# Patient Record
Sex: MALE | Race: OTHER | URBAN - METROPOLITAN AREA
[De-identification: names, ages, dates, MRNs, and addresses within clinical notes are randomized per-mention and may not be internally consistent; named-entity substitution may affect disease eponyms.]

---

## 2017-06-17 ENCOUNTER — OFFICE VISIT (OUTPATIENT)
Dept: FAMILY MEDICINE CLINIC | Age: 42
End: 2017-06-17

## 2017-06-17 VITALS
BODY MASS INDEX: 34.16 KG/M2 | HEIGHT: 65 IN | WEIGHT: 205 LBS | SYSTOLIC BLOOD PRESSURE: 146 MMHG | TEMPERATURE: 97.6 F | HEART RATE: 87 BPM | DIASTOLIC BLOOD PRESSURE: 100 MMHG

## 2017-06-17 DIAGNOSIS — S39.012A LUMBAR STRAIN, INITIAL ENCOUNTER: Primary | ICD-10-CM

## 2017-06-17 DIAGNOSIS — R03.0 ELEVATED BLOOD PRESSURE READING: ICD-10-CM

## 2017-06-17 DIAGNOSIS — E66.9 OBESITY, UNSPECIFIED OBESITY SEVERITY, UNSPECIFIED OBESITY TYPE: ICD-10-CM

## 2017-06-17 RX ORDER — METHOCARBAMOL 500 MG/1
500 TABLET, FILM COATED ORAL 3 TIMES DAILY
Qty: 15 TAB | Refills: 0 | Status: SHIPPED | OUTPATIENT
Start: 2017-06-17 | End: 2022-09-08

## 2017-06-17 RX ORDER — NAPROXEN 500 MG/1
500 TABLET ORAL 2 TIMES DAILY WITH MEALS
Qty: 60 TAB | Refills: 0 | Status: SHIPPED | OUTPATIENT
Start: 2017-06-17 | End: 2022-09-08

## 2017-06-17 NOTE — PROGRESS NOTES
Assessment/Plan:    Jonathan Lal was seen today for back pain and sleep problem. Diagnoses and all orders for this visit:    Lumbar strain, initial encounter  -     methocarbamol (ROBAXIN) 500 mg tablet; Take 1 Tab by mouth three (3) times daily. [place] 1 pastilla cada 8 horas, puedo causar sueno  -     naproxen (NAPROSYN) 500 mg tablet; Take 1 Tab by mouth two (2) times daily (with meals). [place] 1 pastilla 2 veces al kodak con comida    Obesity, unspecified obesity severity, unspecified obesity type    Elevated blood pressure reading    No hx of HTN. Lifestyle changes discussed in depth. F/up in 1 month for recheck    Follow-up Disposition:  Return in about 1 month (around 7/17/2017). SAÚL Hendrix expressed understanding of this plan. An AVS was printed and given to the patient.      ----------------------------------------------------------------------    Chief Complaint   Patient presents with    Back Pain     Back pain X about 5 months    Sleep Problem     Sleep Problem       History of Present Illness:    5 months of right sided low back pain. Saw a chiropractor 3 times but was paying out of pocket and was not improving so he stopped going there. For the past month, he has not been able to work due to the pain. 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 He does not have any bowel or bladder changes and he does not have radiation of the pain. The pt has not had any hx of HTN. He used to weight 175 lbs but over the past few years has gained up to 225 lbs. He has started to lose weight recently on purpose by changing his diet - eating more vegetables. He does not drink ETOH for 2 years. He does not exercise but is generally active through work and with his 3 kids. He is a non smoker    No past medical history on file.     Current Outpatient Prescriptions   Medication Sig Dispense Refill    methocarbamol (ROBAXIN) 500 mg tablet Take 1 Tab by mouth three (3) times daily. [place] 1 pastilla cada 8 horas, puedo causar sueno 15 Tab 0    naproxen (NAPROSYN) 500 mg tablet Take 1 Tab by mouth two (2) times daily (with meals). [place] 1 pastilla 2 veces al kodak con comida 60 Tab 0       No Known Allergies    Social History   Substance Use Topics    Smoking status: Current Every Day Smoker    Smokeless tobacco: Not on file      Comment: OCC    Alcohol use No       No family history on file. Physical Exam:     Visit Vitals    BP (!) 146/100 (BP 1 Location: Left arm, BP Patient Position: Sitting)    Pulse 87    Temp 97.6 °F (36.4 °C) (Oral)    Ht 5' 5.16\" (1.655 m)    Wt 205 lb (93 kg)    BMI 33.95 kg/m2     Looks well, pleasant, large protruding abdominal area  A&Ox3  WDWN NAD  Respirations normal and non labored  MSK exam- tender right paraspinal lumbar muscles. Toe touch reproduces pain at right paraspinal region. SLR right also reproduces pain.   Neuro intact down to toes  Watched pt reproduce his normal lifting technique which was by bending at the waist, then we practiced the correct lifting technique by bending knees with straight back

## 2017-06-17 NOTE — PROGRESS NOTES
AVS printed and reviewed. E scripts discussed and Good Rx wallet card given. F/u 1 month recommended and/or sooner and if sx worsen. Discussion assisted by CAV , Al Jacome.

## 2017-06-17 NOTE — PATIENT INSTRUCTIONS
Distensión de la espalda: Instrucciones de cuidado - [ Back Strain: Care Instructions ]  Instrucciones de cuidado    La distensión de la espalda ocurre cuando usted estira demasiado, o fuerza, un músculo de la espalda. Usted puede lesionarse la espalda en un accidente o cuando hace ejercicio o levanta algo. La mayoría de los daiana de espalda mejoran con reposo y [place]. Usted puede cuidarse en el hogar para ayudar a que myers espalda sane. La atención de seguimiento es swteha parte clave de myers tratamiento y seguridad. Asegúrese de hacer y acudir a todas las citas, y llame a myers médico si está teniendo problemas. También es swetha buena idea saber los resultados de los exámenes y mantener swetha lista de los medicamentos que jordan. ¿Cómo puede cuidarse en el hogar? · Trate de permanecer tan activo marie pueda, aubree deje de hacer o reduzca cualquier actividad que le produzca dolor. · Colóquese hielo o swetha compresa fría en el músculo adolorido de 10 a 20 minutos cada vez para detener la hinchazón. Trate de hacerlo cada 1 o 2 horas amparo 3 días (cuando esté despierto) o hasta que la hinchazón baje. Póngase un paño romero entre el hielo y la piel. · Después de 2 o 3 días, coloque swetha almohadilla térmica ajustada a baja temperatura o un paño tibio sobre la espalda. Algunos médicos sugieren que se alterne entre tratamientos calientes y fríos. · [employer] analgésicos (medicamentos para el dolor) exactamente según las indicaciones. ¨ Si el médico le recetó un analgésico, tómelo según las indicaciones. ¨ Si no está tomando un analgésico recetado, pregúntele a myers médico si puede anabel jesús de [place]. · Trate de dormir de lado con swetha almohada entre las piernas. O, colóquese swetha almohada debajo de las rodillas cuando se acueste [place] Estas medidas pueden aliviar el dolor en la parte baja de la espalda. · Eddie Mohan a poco a myers nivel habitual de actividades. ¿Cuándo debe pedir ayuda?   Llame al 911 en cualquier momento que considere que necesita atención de [place]. Por ejemplo, llame si:  · Es completamente incapaz de  swetha pierna. Llame a myers médico ahora mismo o busque atención médica inmediata si:  · Tiene síntomas nuevos o peores en las piernas, el abdomen o las nalgas (glúteos). Los síntomas pueden incluir:  ¨ Entumecimiento u hormigueo. ¨ Debilidad. ¨ Dolor. · Pierde el control de la vejiga o del intestino. Preste especial atención a los cambios en myers rosalio y asegúrese de comunicarse con myers médico si no mejora marie se esperaba. ¿Dónde puede encontrar más información en inglés? Tony Nagy a http://laila-neftaly.info/. Trini LOPEZ en la búsqueda para aprender más acerca de \"Distensión de la espalda: Instrucciones de cuidado - [ Back Strain: Care Instructions ]. \"  Revisado: [address], 2016  Versión del contenido: 11.2  © 2719-5740 Healthwise, Incorporated. Las instrucciones de cuidado fueron adaptadas bajo licencia por [employer] (which disclaims liability or warranty for this information). Si usted tiene [place] afección médica o sobre estas instrucciones, siempre pregunte a myers profesional de rosalio. Healthwise, Incorporated niega toda garantía o responsabilidad por myers uso de esta información. Presión arterial elevada: Instrucciones de cuidado - [ Elevated Blood Pressure: Care Instructions ]  Instrucciones de cuidado    La presión arterial es swetha medida de la fuerza que ejerce la damon contra las rivas de las arterias. Es normal que la presión arterial suba y baje a lo javon del día. Kaveh si se mantiene blayne por un tiempo, usted tiene presión arterial blayne. Dos números indican myers presión arterial. El primer número es la presión sistólica. Muestra qué tan karly presiona la damon cuando el corazón está bombeando. El genna número es la presión diastólica.  Muestra qué tan karly presiona la [employer] latidos, cuando el corazón está relajado y llenándose de damon. [facility] arterial ideal para adultos es menos de 120/80 (diga \"120 sobre 80\"). La presión arterial blayne es de 140/90 o superior. Usted tiene la presión arterial blayne si el número de [place] es 140 o superior o el número de abajo es 90 o superior, o ambas cosas. La prueba principal para la presión arterial blayne es simple, rápida e indolora. Para diagnosticar presión arterial blayne, myers médico examinará myers presión arterial en momentos diferentes. Después de tomarle la presión, es posible que myers médico le pida que se vuelva a anabel la presión en casa. Si se le diagnostica presión arterial blayne, puede colaborar con myers médico para elaborar un plan a javon plazo para manejarla. La atención de seguimiento es swetha parte clave de myers tratamiento y seguridad. Asegúrese de hacer y acudir a todas las citas, y llame a myers médico si está teniendo problemas. También es swetha buena idea saber los resultados de kika exámenes y mantener swteha lista de los medicamentos que jordan. ¿Cómo puede cuidarse en el hogar? · No fume. Fumar aumenta myers riesgo de ataque cerebral y ataque al corazón. Si necesita ayuda para dejarlo, hable con myers médico sobre programas y medicamentos para dejar de fumar. Estos pueden aumentar kika probabilidades de dejar de fumar para siempre. · Mantenga un peso saludable. · Trate de limitar la cantidad de sodio que ingiere a menos de 2,300 miligramos (mg) al día. Myers médico podría pedirle que trate de ingerir menos de 1,500 mg al día. · Manténgase físicamente activo. Pari al menos 30 minutos de ejercicio la mayoría de los días de la [place]. Caminar es swetha buena opción. Es posible que también quiera hacer otras actividades, marie correr, nadar, [employer], o practicar tenis o deportes de equipo. · No tome alcohol o limite la cantidad que bobo. Hable con myers médico acerca de si puede anabel alcohol. · Coma abundantes frutas, verduras y productos lácteos bajos en grasa.  Consuma menos Elli  y total.  · Aprenda a revisarse la presión arterial en myers hogar. ¿Cuándo debe pedir ayuda? Llame a myers médico ahora mismo o busque atención médica inmediata si:  · Myres presión arterial es mucho más blayne de lo normal (marie 180/110 o superior). · Jaymie que la presión arterial blayne está causando síntomas marie:  ¨ Dolor de renita intenso. [address] Vigile muy de cerca los cambios en myers rosalio, y asegúrese de comunicarse con myers médico si:  · No mejora marie se esperaba. ¿Dónde puede encontrar más información en inglés? Beverley Jones a http://laila-neftaly.info/. Tammie Hayden S139 en la búsqueda para aprender más acerca de \"Presión arterial elevada: Instrucciones de cuidado - [ Elevated Blood Pressure: Care Instructions ]. \"  Revisado: 19 octubre, 2016  Versión del contenido: 11.2  © 3609-7737 Healthwise, Incorporated. Las instrucciones de cuidado fueron adaptadas bajo licencia por [employer] (which disclaims liability or warranty for this information). Si usted tiene [place] afección médica o sobre estas instrucciones, siempre pregunte a myers profesional de rosalio. Healthwise, Incorporated niega toda garantía o responsabilidad por myers uso de esta información. Cómo comenzar un plan para bajar de peso: Instrucciones de cuidado - [ Starting a Weight Loss Plan: Care Instructions ]  Instrucciones de cuidado  Si está pensando en adelgazar, puede que le resulte difícil saber por dónde empezar. Myers médico puede ayudarle a preparar un plan para bajar de peso que se ajuste mejor a kika necesidades. Es posible que prefiera anabel swetha clase de nutrición o ejercicio, o unirse a un lorelei de [de-identified] para adelgazar. Si tiene preguntas sobre cómo cambiar kika hábitos alimenticios o rutina de ejercicio, pregúntele a myers médico sobre la posibilidad de consultar a un dietista registrado o a un especialista en ejercicio. Bajar de peso puede ser un gran desafío. No tiene que hacer grandes cambios de repente. Elin Valderrama y [place]. Cuando esos cambios se conviertan en un hábito, incorpore algunos [employer]. Si glenn que no está listo para hacer cambios en daniel momento, escoja swetha fecha en el futuro. Pari swetha dionna con myers médico para determinar si es apropiado que comience un plan para bajar de [place]. La atención de seguimiento es swetha parte clave de myers tratamiento y seguridad. Asegúrese de hacer y acudir a todas las citas, y llame a myers médico si está teniendo problemas. También es swetha buena idea saber los resultados de los exámenes y mantener swetha lista de los medicamentos que jordan. ¿Cómo puede cuidarse en el hogar? · Establezca metas realistas. Muchas personas esperan perder más peso del que es probable. Perder el 5% a 10% del peso corporal podría ser suficiente para mejorar myers rosalio. · Pari que myers alvaro y kika amigos se involucren para brindarle apoyo. Hable con ellos sobre las razones por las que Lisa Welch y [address] [place] [address]. Pueden ayudarle si participan en kika rutinas de ejercicio y comen con usted, aunque es posible que coman algo diferente. · Busque lo que funcione mejor para usted. Si no tiene tiempo o no le gusta cocinar, un programa que ofrezca barras o batidos marie sustitutos de comida podría ser el más adecuado para usted. Kaveh si le gusta cocinar, lo mejor puede ser un plan que incluya menús y recetas diarios. · Pregúntele a myers médico acerca de otros profesionales de la rosalio que puedan ayudarle a alcanzar kika objetivos para bajar de [place]. ¨ Un dietista puede ayudarle a introducir cambios saludables en myers dieta. ¨ Un especialista en ejercicio o entrenador personal pueden ayudarle a desarrollar un programa de ejercicio [place] y seguro. ¨ Un consejero o psiquiatra puede ayudarle a lidiar con la depresión, la ansiedad u otros problemas familiares que puedan interponerse en myers propósito para adelgazar. · Considere unirse a un lorelei de [de-identified] de personas que tratan de adelgazar.  Myers médico puede recomendarle grupos de apoyo en myers localidad. ¿Dónde puede encontrar más información en inglés? Jamesonantoinette Peguero a http://laila-neftaly.info/. Suha Person N448 en la búsqueda para aprender más acerca de \"Cómo comenzar un plan para bajar de peso: Instrucciones de cuidado - [ Starting a Weight Loss Plan: Care Instructions ]. \"  Revisado: 13 octubre, 2016  Versión del contenido: 11.2  © 9109-9023 Healthwise, Incorporated. Las instrucciones de cuidado fueron adaptadas bajo licencia por [employer] (which disclaims liability or warranty for this information). Si usted tiene [place] afección médica o sobre estas instrucciones, siempre pregunte a myers profesional de rosalio. Healthwise, Incorporated niega toda garantía o responsabilidad por myers uso de esta información. Back Stretches: Exercises  Your Care Instructions  Here are some examples of exercises for stretching your back. Start each exercise slowly. Ease off the exercise if you start to have pain. Your doctor or physical therapist will tell you when you can start these exercises and which ones will work best for you. How to do the exercises  Overhead stretch    1. Stand comfortably with your feet shoulder-width apart. 2. Looking straight ahead, raise both arms over your head and reach toward the ceiling. Do not allow your head to tilt back. 3. Hold for 15 to 30 seconds, then lower your arms to your sides. 4. Repeat 2 to 4 times. Side stretch    1. Stand comfortably with your feet shoulder-width apart. 2. Raise one arm over your head, and then lean to the other side. 3. Slide your hand down your leg as you let the weight of your arm gently stretch your side muscles. Hold for 15 to 30 seconds. 4. Repeat 2 to 4 times on each side. Press-up    1. Lie on your stomach, supporting your body with your forearms. 2. Press your elbows down into the floor to raise your upper back.  As you do this, relax your stomach muscles and allow your back to arch without using your back muscles. As your press up, do not let your hips or pelvis come off the floor. 3. Hold for 15 to 30 seconds, then relax. 4. Repeat 2 to 4 times. Relax and rest    1. Lie on your back with a rolled towel under your neck and a pillow under your knees. Extend your arms comfortably to your sides. 2. Relax and breathe normally. 3. Remain in this position for about 10 minutes. 4. If you can, do this 2 or 3 times each day. Follow-up care is a key part of your treatment and safety. Be sure to make and go to all appointments, and call your doctor if you are having problems. It's also a good idea to know your test results and keep a list of the medicines you take. Where can you learn more? Go to http://laila-neftaly.info/. Enter Q799 in the search box to learn more about \"Back Stretches: Exercises. \"  Current as of: May 23, 2016  Content Version: 11.2  © 0431-5058 [employer]. Care instructions adapted under license by [employer] (which disclaims liability or warranty for this information). If you have questions about a medical condition or this instruction, always ask your healthcare professional. [address] any warranty or liability for your use of this information.

## 2017-06-17 NOTE — MR AVS SNAPSHOT
Visit Information Benigno Tello Personal Médico Departamento Teléfono del Dep. Número de visita 6/17/2017 12:45 PM Eyadjeffrey Patricia Kelli [address] [place] [address] [phone] Upcoming Health Maintenance Date Due DTaP/Tdap/Td series (1 - Tdap) 5/5/1994 INFLUENZA AGE 9 TO ADULT 8/1/2017 Alergias  Review Complete El: 6/17/2017 Por: Estle Bile A partir del:  6/17/2017 No Known Allergies Vacunas actuales Dolores Love No hay ninguna vacuna archivada. No revisadas esta visita You Were Diagnosed With   
  
 Aline Lis Lumbar strain, initial encounter    -  Primary ICD-10-CM: O37.016O ICD-9-CM: 847.2 Obesity, unspecified obesity severity, unspecified obesity type     ICD-10-CM: E66.9 ICD-9-CM: 278.00 Elevated blood pressure reading     ICD-10-CM: R03.0 ICD-9-CM: 796.2 Partes vitales PS Pulso Temperatura [place] ( percentil de crecimiento) Peso (percentil de crecimiento) BMI ([facility])  
 (!) 146/100 (BP 1 Location: Left arm, BP Patient Position: Sitting) 87 97.6 °F (36.4 °C) (Oral) 5' 5.16\" (1.655 m) 205 lb (93 kg) 33.95 kg/m2 Estatus de tabaquísmo Current Every Day Smoker Historial de signos vitales BMI and BSA Data Body Mass Index Body Surface Area 33.95 kg/m 2 2.07 m 2 [facility] Name Phone [facility] [address] [facility] [phone] Hi lista de medicamentos actualizada Lista actualizada el: 6/17/17  1:14 PM.  Christine Angle use hi lista de medicamentos más reciente. methocarbamol 500 mg tablet También conocido marie:  ROBAXIN Take 1 Tab by mouth three (3) times daily. [place] 1 pastilla cada 8 horas, puedo causar sueno  
  
 naproxen 500 mg tablet También conocido marie:  NAPROSYN Take 1 Tab by mouth two (2) times daily (with meals). [place] 1 pastilla 2 veces al kodak con comida Recetas Enviado a la Naomy Refills  
 methocarbamol (ROBAXIN) 500 mg tablet 0 Sig: Take 1 Tab by mouth three (3) times daily. [place] 1 pastilla cada 8 horas, puedo causar sueno Class: Normal  
 Pharmacy: [address] [facility], [address] [facility] [place] Ph #: [phone] Route: Oral  
 naproxen (NAPROSYN) 500 mg tablet 0 Sig: Take 1 Tab by mouth two (2) times daily (with meals). [place] 1 pastilla 2 veces al kodak con comida Class: Normal  
 Pharmacy: [address] [facility], [address] [facility] Ph #: [phone] Route: Oral  
  
Instrucciones para el Paciente Distensión de la espalda: Instrucciones de cuidado - [ Back Strain: Care Instructions ] Instrucciones de cuidado La distensión de la espalda ocurre cuando usted estira demasiado, o fuerza, un músculo de la espalda. Usted puede lesionarse la espalda en un accidente o cuando hace ejercicio o levanta algo. La mayoría de los diaana de espalda mejoran con reposo y [place]. Usted puede cuidarse en el hogar para ayudar a que myers espalda sane. La atención de seguimiento es swetha parte clave de myers tratamiento y seguridad. Asegúrese de hacer y acudir a todas las citas, y llame a myers médico si está teniendo problemas. También es swetha buena idea saber los resultados de los exámenes y mantener swetha lista de los medicamentos que jordan. Cómo puede cuidarse en el hogar? · Trate de permanecer tan activo marie pueda, aubree deje de hacer o reduzca cualquier actividad que le produzca dolor. · Colóquese hielo o swetha compresa fría en el músculo adolorido de 10 a 20 minutos cada vez para detener la hinchazón. Trate de hacerlo cada 1 o 2 horas amparo 3 días (cuando esté despierto) o hasta que la hinchazón baje. Póngase un paño romero entre el hielo y la piel. · Después de 2 o 3 días, coloque swetha almohadilla térmica ajustada a baja temperatura o un paño tibio sobre la espalda.  Antonio Castro sugieren que se alterne entre tratamientos calientes y fríos. · [employer] International analgésicos (medicamentos para el dolor) exactamente según las indicaciones. ¨ Si el médico le recetó un analgésico, tómelo según las indicaciones. ¨ Si no está tomando un analgésico recetado, pregúntele a myers médico si puede anabel jesús de [employer]. · Trate de dormir de lado con swetha almohada entre las piernas. O, colóquese swetha almohada debajo de las rodillas cuando se acueste [place]  [place] [facility] Estas medidas pueden aliviar el dolor en la parte baja de la espalda. · Anna Boggs a poco a myers nivel habitual de actividades. Cuándo debe pedir ayuda? Llame al 911 en cualquier momento que considere que necesita atención de [place]. Por ejemplo, llame si: 
· Es completamente incapaz de  swetha pierna. Llame a myers médico ahora mismo o busque atención médica inmediata si: · Tiene síntomas nuevos o peores en las piernas, el abdomen o las nalgas (glúteos). Los síntomas pueden incluir: 
¨ Entumecimiento u hormigueo. ¨ Debilidad. ¨ Dolor. · Pierde el control de la vejiga o del intestino. Preste especial atención a los cambios en myers rosalio y asegúrese de comunicarse con myers médico si no mejora marie se esperaba. Dónde puede encontrar más información en inglés? Alicia Dacosta a [email or web address] Nini Sandra X859 en la búsqueda para aprender más acerca de \"Distensión de la espalda: Instrucciones de cuidado - [ Back Strain: Care Instructions ]. \" 
Revisado: 23 [place], 2016 Versión del contenido: 11.2 © 7590-0920 [employer], Incorporated. Las instrucciones de cuidado fueron adaptadas bajo licencia por [facility] Connections (which disclaims liability or warranty for this information). Si usted tiene [place] afección médica o sobre estas instrucciones, siempre pregunte a myers profesional de rosalio. [employer], [employer] niega toda garantía o responsabilidad por myers uso de esta información. Presión arterial elevada: Instrucciones de cuidado - [ Elevated Blood Pressure: Care Instructions ] Instrucciones de cuidado La presión arterial es swetha medida de la fuerza que ejerce la damon contra las rivas de las arterias. Es normal que la presión arterial suba y baje a lo javon del día. Kaveh si se mantiene blayne por un tiempo, usted tiene presión arterial blayne. Dos números indican myers presión arterial. El primer número es la presión sistólica. Muestra qué tan karly presiona la damon cuando el corazón está bombeando. El genna número es la presión diastólica. Muestra qué tan karly presiona la [employer] latidos, cuando el corazón está relajado y llenándose de [place]. Velta [place] arterial ideal para adultos es menos de 120/80 (diga \"120 sobre 80\"). La presión arterial blayne es de 140/90 o superior. Usted tiene la presión arterial blayne si el número de [place] es 140 o superior o el número de abajo es 90 o superior, o ambas cosas. La prueba principal para la presión arterial blayne es simple, rápida e indolora. Para diagnosticar presión arterial blayne, myers médico examinará myers presión arterial en momentos diferentes. Después de tomarle la presión, es posible que myers médico le pida que se vuelva a anabel la presión en casa. Si se le diagnostica presión arterial blayne, puede colaborar con myers médico para elaborar un plan a javon plazo para manejarla. La atención de seguimiento es swetha parte clave de myers tratamiento y seguridad. Asegúrese de hacer y acudir a todas las citas, y llame a myers médico si está teniendo problemas. También es swetha buena idea saber los resultados de kika exámenes y mantener swetha lista de los medicamentos que jordan. Cómo puede cuidarse en el hogar? · No fume. Fumar aumenta myers riesgo de ataque cerebral y ataque al corazón. Si necesita ayuda para dejarlo, hable con myers médico sobre programas y medicamentos para dejar de fumar. Estos pueden aumentar kika probabilidades de dejar de fumar para siempre. · Mantenga un peso saludable. · Trate de limitar la cantidad de sodio que ingiere a menos de 2,300 miligramos (mg) al día. Myers médico podría pedirle que trate de ingerir menos de 1,500 mg al día. · Manténgase físicamente activo. Pari al menos 30 minutos de ejercicio la mayoría de los días de la [place]. Caminar es swetha buena opción. Es posible que también quiera hacer otras actividades, marie correr, nadar, [employer], o practicar tenis o deportes de equipo. · No tome alcohol o limite la cantidad que bobo. Hable con myers médico acerca de si puede anabel alcohol. · Coma abundantes frutas, verduras y productos lácteos bajos en grasa. Consuma menos grasa saturada y total. 
· Aprenda a revisarse la presión arterial en myers hogar. Cuándo debe pedir ayuda? Llame a myers médico ahora mismo o busque atención médica inmediata si: 
· Myers presión arterial es mucho más blayne de lo normal (marie 180/110 o superior). · Jaymie que la presión arterial blayne está causando síntomas marie: ¨ Dolor de renita intenso. [address]. Vigile muy de cerca los cambios en myers rosalio, y asegúrese de comunicarse con myers médico si: 
· No mejora marie se esperaba. Dónde puede encontrar más información en inglés? Xavier Caras a [email or web address] Aldone Rodolfo [id number] en la búsqueda para aprender más acerca de \"Presión arterial elevada: Instrucciones de cuidado - [ Elevated Blood Pressure: Care Instructions ]. \" 
Revisado: 19 octubre, 2016 Versión del contenido: 11.2 © 6691-6809 [employer], Incorporated. Las instrucciones de cuidado fueron adaptadas bajo licencia por Good [employer] Connections (which disclaims liability or warranty for this information). Si usted tiene [place] afección médica o sobre estas instrucciones, siempre pregunte a myers profesional de rosalio. [facility], Incorporated niega toda garantía o responsabilidad por myers uso de esta información. Cómo comenzar un plan para bajar de peso: Instrucciones de cuidado - [ Starting a Weight Loss Plan: Care Instructions ] Instrucciones de cuidado Si está pensando en adelgazar, puede que le resulte difícil saber por dónde empezar. Myers médico puede ayudarle a preparar un plan para bajar de peso que se ajuste mejor a kika necesidades. Es posible que prefiera anabel swetha clase de nutrición o ejercicio, o unirse a un lorelei de [de-identified] para adelgazar. Si tiene preguntas sobre cómo cambiar kika hábitos alimenticios o rutina de ejercicio, pregúntele a myers médico sobre la posibilidad de consultar a un dietista registrado o a un especialista en ejercicio. Bajar de peso puede ser un gran desafío. No tiene que hacer grandes cambios de repente. Ivjhony Castro y [place]. Cuando esos cambios se conviertan en un hábito, incorpore algunos [employer]. Si glenn que no está listo para hacer cambios en daniel momento, escoja swetha fecha en el futuro. Pari swetha dionna con myers médico para determinar si es apropiado que comience un plan para bajar de [place]. La atención de seguimiento es swetha parte clave de myers tratamiento y seguridad. Asegúrese de hacer y acudir a todas las citas, y llame a myers médico si está teniendo problemas. También es swetha buena idea saber los resultados de los exámenes y mantener swetha lista de los medicamentos que jordan. Cómo puede cuidarse en el hogar? · Establezca metas realistas. Muchas personas esperan perder más peso del que es probable. Perder el 5% a 10% del peso corporal podría ser suficiente para mejorar myers rosalio. · Pari que myers alvaro y kika amigos se involucren para brindarle apoyo. Hable con ellos sobre las razones por las que Larry Clemente y Shahida RosarioReynolds [place]. Pueden ayudarle si participan en kika rutinas de ejercicio y comen con usted, aunque es posible que coman algo diferente. · Busque lo que funcione mejor para usted.  Si no tiene tiempo o no le gusta cocinar, un programa que ofrezca barras o batidos marie sustitutos de comida podría ser el más adecuado para usted. Kaveh si le gusta cocinar, lo mejor puede ser un plan que incluya menús y recetas diarios. · Pregúntele a hi médico acerca de otros profesionales de la rosalio que puedan ayudarle a alcanzar kika objetivos para bajar de [place]. ¨ Un dietista puede ayudarle a introducir cambios saludables en hi dieta. ¨ Un especialista en ejercicio o entrenador personal pueden ayudarle a desarrollar un programa de ejercicio [employer] y seguro. ¨ Un consejero o psiquiatra puede ayudarle a lidiar con la depresión, la ansiedad u otros problemas familiares que puedan interponerse en hi propósito para adelgazar. · Considere unirse a un lorelei de [de-identified] de personas que tratan de adelgazar. Hi médico puede recomendarle grupos de apoyo en hi localidad. Dónde puede encontrar más información en inglés? Ailyn Minaya a [email or web address] Maile Espinozasert F801 en la búsqueda para aprender más acerca de \"Cómo comenzar un plan para bajar de peso: Instrucciones de cuidado - [ Starting a Weight Loss Plan: Care Instructions ]. \" 
Revisado: 13 octubre, 2016 Versión del contenido: 11.2 © 8635-9621 Healthwise, Incorporated. Las instrucciones de cuidado fueron adaptadas bajo licencia por Good Help Connections (which disclaims liability or warranty for this information). Si usted tiene [place] afección médica o sobre estas instrucciones, siempre pregunte a hi profesional de rosalio. Healthwise, Incorporated niega toda garantía o responsabilidad por hi uso de esta información. Introducing [facility]! [facility] introduce portal paciente MyChart . Ahora se puede acceder a partes de hi expediente médico, enviar por correo electrónico la oficina de hi médico y solicitar renovaciones de medicamentos en línea. En hi navegador de Internet , Luke Berger a [email or web address] Odin clic en el usuario por Dock Paddy? Nancy Calls clic aquí en la sesión Marveen Seaangie. Verá la página de registro [place]. Ingrese myers código de Bank [employer] aramis y marie aparece a continuación. Usted no tendrá que [employer] código después de chauncey completado el proceso de registro . Si usted no se inscribe antes de la fecha de caducidad , debe solicitar un nuevo código. · MyChart Código de acceso : [id number] Expires: 9/15/2017  1:14 PM 
 
Ingresa los últimos cuatro dígitos de myers Número de Seguro Social ( xxxx ) y fecha de nacimiento ( dd / mm / aaaa ) marie se indica y odin clic en Enviar. Usted será llevado a la siguiente página de registro . Crear un ID MyChart . Esta será myers ID de inicio de sesión de MyChart y no puede ser [place] , por lo que pensar en swetha que es Lafonda Darner y fácil de recordar . Crear swetha contraseña MyChart . Usted puede cambiar myers contraseña en cualquier momento . Ingrese myers Password Reset de preguntas y [place] . [place] se puede utilizar en un momento posterior si usted olvida myers contraseña. Introduzca myers dirección de correo electrónico . Colt Beckwith recibirá swetha notificación por correo electrónico cuando la nueva información está disponible en MyChart . Melissa villaic en Registrarse. Woo Cobb rafita y descargar porciones de myers expediente médico. 
Odin clic en el enlace de descarga del menú Resumen para descargar swetha copia portátil de myers información médica . Si tiene [employer] , por favor visite la sección de preguntas frecuentes del sitio web MyChart . Recuerde, MyChart NO es que se utilizará para las necesidades urgentes. Para emergencias médicas , llame al 911 . Ahora disponible en myers iPhone y Android ! Por favor proporcione daniel resumen de la documentación de cuidado a myers próximo proveedor. If you have any questions after today's visit, please call [phone].

## 2022-09-08 ENCOUNTER — HOSPITAL ENCOUNTER (OUTPATIENT)
Dept: LAB | Age: 47
Discharge: HOME | End: 2022-09-08

## 2022-09-08 ENCOUNTER — OFFICE VISIT (OUTPATIENT)
Dept: FAMILY MEDICINE CLINIC | Age: 47
End: 2022-09-08

## 2022-09-08 VITALS
DIASTOLIC BLOOD PRESSURE: 100 MMHG | SYSTOLIC BLOOD PRESSURE: 169 MMHG | WEIGHT: 206.6 LBS | TEMPERATURE: 97.9 F | OXYGEN SATURATION: 98 % | HEART RATE: 71 BPM | BODY MASS INDEX: 34.21 KG/M2

## 2022-09-08 DIAGNOSIS — R30.9 URINARY PAIN: ICD-10-CM

## 2022-09-08 DIAGNOSIS — N40.0 ENLARGED PROSTATE: ICD-10-CM

## 2022-09-08 DIAGNOSIS — I10 HYPERTENSION, UNSPECIFIED TYPE: ICD-10-CM

## 2022-09-08 DIAGNOSIS — I10 HYPERTENSION, UNSPECIFIED TYPE: Primary | ICD-10-CM

## 2022-09-08 LAB
BILIRUB UR QL STRIP: NEGATIVE
GLUCOSE UR-MCNC: NEGATIVE MG/DL
KETONES P FAST UR STRIP-MCNC: NEGATIVE MG/DL
PH UR STRIP: 6 [PH] (ref 4.6–8)
PROT UR QL STRIP: NEGATIVE
SP GR UR STRIP: 1 (ref 1–1.03)
UA UROBILINOGEN AMB POC: NORMAL (ref 0.2–1)
URINALYSIS CLARITY POC: CLEAR
URINALYSIS COLOR POC: YELLOW
URINE BLOOD POC: NEGATIVE
URINE LEUKOCYTES POC: NEGATIVE
URINE NITRITES POC: NEGATIVE

## 2022-09-08 PROCEDURE — 81002 URINALYSIS NONAUTO W/O SCOPE: CPT | Performed by: FAMILY MEDICINE

## 2022-09-08 PROCEDURE — 99203 OFFICE O/P NEW LOW 30 MIN: CPT | Performed by: FAMILY MEDICINE

## 2022-09-08 RX ORDER — TAMSULOSIN HYDROCHLORIDE 0.4 MG/1
0.4 CAPSULE ORAL DAILY
Qty: 90 CAPSULE | Refills: 3 | Status: SHIPPED | OUTPATIENT
Start: 2022-09-08

## 2022-09-08 RX ORDER — AMLODIPINE BESYLATE 5 MG/1
5 TABLET ORAL DAILY
Qty: 90 TABLET | Refills: 3 | Status: SHIPPED | OUTPATIENT
Start: 2022-09-08 | End: 2022-10-19

## 2022-09-08 NOTE — PROGRESS NOTES
AVS printed and with the assistance of Setswana interpretor, Wilfredo Ngo, reviewed with patient. Goodrx coupons for both of today's new medications (Tamsulosin 0.4mg and Amlodipine 5mg) texted to patient's cell phone. Records release authorization form signed and patient and faxed to [facility] to obtain medical records requested by provider. Patient advised that referral for Urology will be through [facility] and that a team member will be contacting him for financial screening and a telephone number to schedule this,  Patient advised to return for follow-up in 6 weeks. Patient indicated understanding and appreciated the service today.

## 2022-09-08 NOTE — PROGRESS NOTES
1. Have you been to the ER, urgent care clinic since your last visit? Hospitalized since your last visit? Yes [facility]- Urinary problems- bleeding blood. Medications prescribed ran out. 2. Have you seen or consulted any other health care providers outside of the [address] [place] [address] since your last visit? Include any pap smears or colon screening.  Yes See above

## 2022-09-08 NOTE — PROGRESS NOTES
HISTORY  Shad Garcia is a 50 y.o. male. HPI  Patient states he has been having urinary problems. He was told he had prostate problems. He was urinating blood. He was given some oral medications, and was told it could be a prostate problem. He was told he needed to see a urologist but couldn't afford the urologist.  Patient is presenting burning sensation everytime he urinates. Also blood in urine. This happened 1 month ago. Right now experience some dysuria. He took antibiotics for 7 days and his symptoms improved some. 1 [place] who told him he had enlarged prostate  Review of Systems   Constitutional:  Negative for chills, fever and weight loss. Respiratory:  Negative for cough, hemoptysis and sputum production. Cardiovascular:  Negative for chest pain, palpitations and orthopnea. Gastrointestinal:  Negative for abdominal pain, constipation, diarrhea, heartburn, nausea and vomiting. Genitourinary:  Positive for dysuria. Negative for frequency and urgency. Musculoskeletal:  Negative for back pain, myalgias and neck pain. Skin:  Negative for itching and rash. Neurological:  Negative for dizziness, sensory change and speech change. BP (!) 169/100 (BP 1 Location: Left upper arm, BP Patient Position: Sitting, BP Cuff Size: Adult)   Pulse 71   Temp 97.9 °F (36.6 °C) (Temporal)   Wt 206 lb 9.6 oz (93.7 kg)   SpO2 98%   BMI 34.21 kg/m²   Physical Exam  Constitutional:       General: He is not in acute distress. HENT:      Head: Normocephalic. Right Ear: Tympanic membrane normal. There is no impacted cerumen. Left Ear: Tympanic membrane normal. There is no impacted cerumen. Mouth/Throat:      Mouth: Mucous membranes are moist.      Pharynx: No oropharyngeal exudate. Eyes:      General:         Right eye: No discharge. Left eye: No discharge. Extraocular Movements: Extraocular movements intact. Pupils: Pupils are equal, round, and reactive to light. Cardiovascular:      Rate and Rhythm: Normal rate and regular rhythm. Pulses: Normal pulses. Heart sounds: Normal heart sounds. No murmur heard. Pulmonary:      Effort: Pulmonary effort is normal.      Breath sounds: No wheezing or rhonchi. Abdominal:      General: Bowel sounds are normal. There is no distension. Palpations: Abdomen is soft. There is no mass. Genitourinary:     Comments: Rectal exam: grade III prostate, no masses palpable  Musculoskeletal:         General: No swelling or tenderness. Normal range of motion. Cervical back: Normal range of motion. No rigidity. Skin:     General: Skin is warm. Findings: No erythema or rash. Neurological:      Mental Status: He is alert. ASSESSMENT and PLAN  Diagnoses and all orders for this visit:    1. Hypertension, unspecified type  -     amLODIPine (NORVASC) 5 mg tablet; Take 1 Tablet by mouth daily.  -     CBC WITH AUTOMATED DIFF; Future  -     METABOLIC PANEL, COMPREHENSIVE; Future  -     LIPID PANEL; Future  -     HEMOGLOBIN A1C WITH EAG; Future    2. Urinary pain  -     AMB POC URINALYSIS DIP STICK MANUAL W/O MICRO    3. Enlarged prostate  -     REFERRAL TO UROLOGY  -     tamsulosin (Flomax) 0.4 mg capsule; Take 1 Capsule by mouth daily.  -     PSA SCREENING (SCREENING);  Future    50year old male with urinary pain, enlarged prostate, who presented dysuria and hematuria 1 month ago,  we will start flomax and refer to urology for evaluation and management  Blood pressure is elevated, labs ordered, start amlodipine

## 2022-09-09 LAB
ALBUMIN SERPL-MCNC: 4 G/DL (ref 3.5–5)
ALBUMIN/GLOB SERPL: 1 {RATIO} (ref 1.1–2.2)
ALP SERPL-CCNC: 92 U/L (ref 45–117)
ALT SERPL-CCNC: 66 U/L (ref 12–78)
ANION GAP SERPL CALC-SCNC: 5 MMOL/L (ref 5–15)
AST SERPL-CCNC: 24 U/L (ref 15–37)
BASOPHILS # BLD: 0 K/UL (ref 0–0.1)
BASOPHILS NFR BLD: 1 % (ref 0–1)
BILIRUB SERPL-MCNC: 0.3 MG/DL (ref 0.2–1)
BUN SERPL-MCNC: 13 MG/DL (ref 6–20)
BUN/CREAT SERPL: 12 (ref 12–20)
CALCIUM SERPL-MCNC: 9.7 MG/DL (ref 8.5–10.1)
CHLORIDE SERPL-SCNC: 107 MMOL/L (ref 97–108)
CHOLEST SERPL-MCNC: 220 MG/DL
CO2 SERPL-SCNC: 25 MMOL/L (ref 21–32)
CREAT SERPL-MCNC: 1.07 MG/DL (ref 0.7–1.3)
DIFFERENTIAL METHOD BLD: ABNORMAL
EOSINOPHIL # BLD: 0.2 K/UL (ref 0–0.4)
EOSINOPHIL NFR BLD: 3 % (ref 0–7)
ERYTHROCYTE [DISTWIDTH] IN BLOOD BY AUTOMATED COUNT: 11.7 % (ref 11.5–14.5)
EST. AVERAGE GLUCOSE BLD GHB EST-MCNC: 120 MG/DL
GLOBULIN SER CALC-MCNC: 4 G/DL (ref 2–4)
GLUCOSE SERPL-MCNC: 122 MG/DL (ref 65–100)
HBA1C MFR BLD: 5.8 % (ref 4–5.6)
HCT VFR BLD AUTO: 48.8 % (ref 36.6–50.3)
HDLC SERPL-MCNC: 26 MG/DL
HDLC SERPL: 8.5 {RATIO} (ref 0–5)
HGB BLD-MCNC: 16.5 G/DL (ref 12.1–17)
IMM GRANULOCYTES # BLD AUTO: 0 K/UL (ref 0–0.04)
IMM GRANULOCYTES NFR BLD AUTO: 0 % (ref 0–0.5)
LDLC SERPL CALC-MCNC: ABNORMAL MG/DL (ref 0–100)
LDLC SERPL DIRECT ASSAY-MCNC: 88 MG/DL (ref 0–100)
LYMPHOCYTES # BLD: 2.4 K/UL (ref 0.8–3.5)
LYMPHOCYTES NFR BLD: 38 % (ref 12–49)
MCH RBC QN AUTO: 30 PG (ref 26–34)
MCHC RBC AUTO-ENTMCNC: 33.8 G/DL (ref 30–36.5)
MCV RBC AUTO: 88.7 FL (ref 80–99)
MONOCYTES # BLD: 0.4 K/UL (ref 0–1)
MONOCYTES NFR BLD: 6 % (ref 5–13)
NEUTS SEG # BLD: 3.3 K/UL (ref 1.8–8)
NEUTS SEG NFR BLD: 52 % (ref 32–75)
NRBC # BLD: 0 K/UL (ref 0–0.01)
NRBC BLD-RTO: 0 PER 100 WBC
PLATELET # BLD AUTO: 422 K/UL (ref 150–400)
PMV BLD AUTO: 10.2 FL (ref 8.9–12.9)
POTASSIUM SERPL-SCNC: 4.3 MMOL/L (ref 3.5–5.1)
PROT SERPL-MCNC: 8 G/DL (ref 6.4–8.2)
PSA SERPL-MCNC: 6.6 NG/ML (ref 0.01–4)
RBC # BLD AUTO: 5.5 M/UL (ref 4.1–5.7)
SODIUM SERPL-SCNC: 137 MMOL/L (ref 136–145)
TRIGL SERPL-MCNC: 655 MG/DL (ref ?–150)
VLDLC SERPL CALC-MCNC: ABNORMAL MG/DL
WBC # BLD AUTO: 6.3 K/UL (ref 4.1–11.1)

## 2022-09-09 PROCEDURE — 80053 COMPREHEN METABOLIC PANEL: CPT

## 2022-09-09 PROCEDURE — 83036 HEMOGLOBIN GLYCOSYLATED A1C: CPT

## 2022-09-09 PROCEDURE — 83721 ASSAY OF BLOOD LIPOPROTEIN: CPT

## 2022-09-09 PROCEDURE — 85025 COMPLETE CBC W/AUTO DIFF WBC: CPT

## 2022-09-09 PROCEDURE — 80061 LIPID PANEL: CPT

## 2022-09-09 PROCEDURE — 84153 ASSAY OF PSA TOTAL: CPT

## 2022-09-12 NOTE — PROGRESS NOTES
Elevated cholesterol, triglycerides and prostate test in blood.   Please make sure patient comes in for his follow up appointment next month

## 2022-09-22 ENCOUNTER — OFFICE VISIT (OUTPATIENT)
Dept: FAMILY MEDICINE CLINIC | Age: 47
End: 2022-09-22

## 2022-09-22 DIAGNOSIS — Z71.89 COUNSELING AND COORDINATION OF CARE: Primary | ICD-10-CM

## 2022-09-22 PROCEDURE — 99080 SPECIAL REPORTS OR FORMS: CPT | Performed by: FAMILY MEDICINE

## 2022-09-22 NOTE — PROGRESS NOTES
AN financial screening for renewal is complete. Patient has been instructed to call appointment line on or after 10/6/22. Patient has been screened for [facility]. Patient has requested resources for Food (score 3). Resources have been sent to patient via text.

## 2022-10-13 NOTE — PROGRESS NOTES
Tc to the pt with [facility] in [place] , to give him his lab results message from the provider. The pt was called 2x. He verified his name and . He was given his message and his upcoming appt was confirmed with him.  Haylee Carreon RN

## 2022-10-19 ENCOUNTER — OFFICE VISIT (OUTPATIENT)
Dept: FAMILY MEDICINE CLINIC | Age: 47
End: 2022-10-19

## 2022-10-19 VITALS
WEIGHT: 215 LBS | HEIGHT: 65 IN | SYSTOLIC BLOOD PRESSURE: 160 MMHG | TEMPERATURE: 97.5 F | BODY MASS INDEX: 35.82 KG/M2 | DIASTOLIC BLOOD PRESSURE: 102 MMHG | OXYGEN SATURATION: 99 % | HEART RATE: 81 BPM

## 2022-10-19 DIAGNOSIS — I10 HYPERTENSION, UNSPECIFIED TYPE: Primary | ICD-10-CM

## 2022-10-19 PROCEDURE — 99213 OFFICE O/P EST LOW 20 MIN: CPT | Performed by: FAMILY MEDICINE

## 2022-10-19 RX ORDER — AMLODIPINE AND OLMESARTAN MEDOXOMIL 10; 20 MG/1; MG/1
1 TABLET ORAL DAILY
Qty: 30 TABLET | Refills: 5 | Status: SHIPPED | OUTPATIENT
Start: 2022-10-19

## 2022-10-19 NOTE — PROGRESS NOTES
[address] is a 50 y.o. male. 51 yo history of HTN and BPH who presents for 3m follow up. BP remains elevated today but denies any current symptoms. Has occasional shortness of breath but denies HA, dizziness, chest pain, or leg swelling. Also seen last time for LUTS which have improved with tamsulosin. Not waking up at night to urinate, denies increased frequency or urgency and improvement in stream. No more episodes of hematuria. Review of Systems   Constitutional:  Negative for chills, malaise/fatigue and weight loss. Eyes:  Negative for blurred vision. Respiratory:  Negative for shortness of breath. Cardiovascular:  Negative for chest pain, palpitations and leg swelling. Genitourinary:  Negative for frequency, hematuria and urgency. Neurological:  Negative for dizziness and headaches. Objective  Visit Vitals  BP (!) 160/102 (BP 1 Location: Left upper arm, BP Patient Position: Sitting)   Pulse 81   Temp 97.5 °F (36.4 °C) (Tympanic)   Ht 5' 5.16\" (1.655 m)   Wt 215 lb (97.5 kg)   SpO2 99%   BMI 35.61 kg/m²    Physical Exam  Constitutional:       Appearance: Normal appearance. HENT:      Mouth/Throat:      Mouth: Mucous membranes are moist.   Eyes:      Extraocular Movements: Extraocular movements intact. Conjunctiva/sclera: Conjunctivae normal.   Cardiovascular:      Rate and Rhythm: Normal rate and regular rhythm. Heart sounds: Normal heart sounds. Pulmonary:      Effort: Pulmonary effort is normal.      Breath sounds: Normal breath sounds. Abdominal:      General: Abdomen is flat. Palpations: Abdomen is soft. Tenderness: There is no abdominal tenderness. Musculoskeletal:         General: Normal range of motion. Skin:     General: Skin is warm and dry. Neurological:      Mental Status: He is alert. Assessment & Plan  Diagnoses and all orders for this visit:     1.  Hypertension, unspecified type  -     amLODIPine-Olmesartan 10-20 mg tab; Take 1 Tablet by mouth daily. 50 male with a history of HTN and BPH who presents for follow up. Improved sx with tamsulosin, appt with urology Monday.  BP remains uncontrolled today, repeat 152/102  -switch from amlodipine 10 to amlodipine-olmesartan (10/20)  -continue flomax

## 2022-10-19 NOTE — PROGRESS NOTES
Coordination of Care  1. Have you been to the ER, urgent care clinic since your last visit? Hospitalized since your last visit? No    2. Have you seen or consulted any other health care providers outside of the [address] [place] [address] since your last visit? Include any pap smears or colon screening. No    Does the patient need refills? NO    Learning Assessment Complete? yes  Depression Screening complete in the past 12 months? yes      CMA handed patient resource letter for housing due to social determinants screening.

## 2022-10-19 NOTE — PROGRESS NOTES
*ATTENTION:  This note has been created by a medical student for educational purposes only. Please do not refer to the content of this note for clinical decision-making, billing, or other purposes. Please see attending physicians note to obtain clinical information on this patient. *       Subjective  Kristopher Hoang is a 50 y.o. male. 51 yo history of HTN and BPH who presents for 3m follow up. BP remains elevated today but denies any current symptoms. Has occasional shortness of breath but denies HA, dizziness, chest pain, or leg swelling. Also seen last time for LUTS which have improved with tamsulosin. Not waking up at night to urinate, denies increased frequency or urgency and improvement in stream. No more episodes of hematuria. Review of Systems   Constitutional:  Negative for chills, malaise/fatigue and weight loss. Eyes:  Negative for blurred vision. Respiratory:  Negative for shortness of breath. Cardiovascular:  Negative for chest pain, palpitations and leg swelling. Genitourinary:  Negative for frequency, hematuria and urgency. Neurological:  Negative for dizziness and headaches. Objective  Visit Vitals  BP (!) 160/102 (BP 1 Location: Left upper arm, BP Patient Position: Sitting)   Pulse 81   Temp 97.5 °F (36.4 °C) (Tympanic)   Ht 5' 5.16\" (1.655 m)   Wt 215 lb (97.5 kg)   SpO2 99%   BMI 35.61 kg/m²     Physical Exam  Constitutional:       Appearance: Normal appearance. HENT:      Mouth/Throat:      Mouth: Mucous membranes are moist.   Eyes:      Extraocular Movements: Extraocular movements intact. Conjunctiva/sclera: Conjunctivae normal.   Cardiovascular:      Rate and Rhythm: Normal rate and regular rhythm. Heart sounds: Normal heart sounds. Pulmonary:      Effort: Pulmonary effort is normal.      Breath sounds: Normal breath sounds. Abdominal:      General: Abdomen is flat. Palpations: Abdomen is soft. Tenderness: There is no abdominal tenderness. Musculoskeletal:         General: Normal range of motion. Skin:     General: Skin is warm and dry. Neurological:      Mental Status: He is alert. Assessment & Plan  Diagnoses and all orders for this visit:    1. Hypertension, unspecified type  -     amLODIPine-Olmesartan 10-20 mg tab; Take 1 Tablet by mouth daily. 50 male with a history of HTN and BPH who presents for follow up. Improved sx with tamsulosin, appt with urology Monday.  BP remains uncontrolled today, repeat 152/102  -switch from amlodipine 10 to amlodipine-olmesartan (10/20)  -continue [facility]

## 2022-10-19 NOTE — PROGRESS NOTES
An After Visit Summary was printed and given to the patient. I went over medication changes with patient, told patient to stop Amlodipine and start amlodipine-olmesartan. Instructed patient to schedule his 6 weeks follow up today. Patient verbalized understanding. Good Rx coupon given.      Sharron Mcconnell RN

## 2022-11-30 ENCOUNTER — OFFICE VISIT (OUTPATIENT)
Dept: FAMILY MEDICINE CLINIC | Age: 47
End: 2022-11-30

## 2022-11-30 VITALS
WEIGHT: 218 LBS | SYSTOLIC BLOOD PRESSURE: 146 MMHG | TEMPERATURE: 97.7 F | BODY MASS INDEX: 36.1 KG/M2 | DIASTOLIC BLOOD PRESSURE: 83 MMHG

## 2022-11-30 DIAGNOSIS — G47.00 INSOMNIA, UNSPECIFIED TYPE: ICD-10-CM

## 2022-11-30 DIAGNOSIS — I10 HYPERTENSION, UNSPECIFIED TYPE: Primary | ICD-10-CM

## 2022-11-30 DIAGNOSIS — N40.0 ENLARGED PROSTATE: ICD-10-CM

## 2022-11-30 RX ORDER — TAMSULOSIN HYDROCHLORIDE 0.4 MG/1
0.4 CAPSULE ORAL DAILY
Qty: 90 CAPSULE | Refills: 3 | Status: SHIPPED | OUTPATIENT
Start: 2022-11-30

## 2022-11-30 RX ORDER — AMLODIPINE AND OLMESARTAN MEDOXOMIL 10; 20 MG/1; MG/1
1 TABLET ORAL DAILY
Qty: 90 TABLET | Refills: 3 | Status: SHIPPED | OUTPATIENT
Start: 2022-11-30

## 2022-11-30 NOTE — PROGRESS NOTES
Patient discharged with AVS. Medication review completed. Good RX coupons given to patient. Instructed to make an appointment for follow-up in 3 months. Patient given an opportunity to voice questions and concerns. Patient verbalized understanding of information given. Interpretor Clarisse Noriega assisted.

## 2022-11-30 NOTE — PROGRESS NOTES
Coordination of Care  1. Have you been to the ER, urgent care clinic since your last visit? Hospitalized since your last visit? No    2. Have you seen or consulted any other health care providers outside of the [address] [place] [address] since your last visit? Include any pap smears or colon screening. No    Does the patient need refills? No,  Patient states he went to the pharmacy for refill, they asked for a provider's prescription    Learning Assessment Complete?  yes  Depression Screening complete in the past 12 months? yes

## 2022-11-30 NOTE — PROGRESS NOTES
HISTORY  Shad Garcia is a 50 y.o. male. HPI  Patient states he went to urology, had evaluation and was told he didn't have cancer,  he needs to continue check up next years 0. Patient states he is suffering with insomnia. Would like to know if he could take something to help fall asleep. Review of Systems   Constitutional:  Negative for chills, malaise/fatigue and weight loss. Eyes:  Negative for blurred vision. Respiratory:  Negative for shortness of breath. Cardiovascular:  Negative for chest pain, palpitations and leg swelling. Genitourinary:  Negative for frequency, hematuria and urgency. Neurological:  Negative for dizziness and headaches. BP (!) 146/83 (BP 1 Location: Left upper arm, BP Patient Position: Sitting, BP Cuff Size: Adult)   Temp 97.7 °F (36.5 °C) (Temporal)   Wt 218 lb (98.9 kg)   BMI 36.10 kg/m²   Physical Exam  Constitutional:       Appearance: Normal appearance. HENT:      Mouth/Throat:      Mouth: Mucous membranes are moist.   Eyes:      Extraocular Movements: Extraocular movements intact. Conjunctiva/sclera: Conjunctivae normal.   Cardiovascular:      Rate and Rhythm: Normal rate and regular rhythm. Heart sounds: Normal heart sounds. Pulmonary:      Effort: Pulmonary effort is normal.      Breath sounds: Normal breath sounds. Abdominal:      General: Abdomen is flat. Palpations: Abdomen is soft. Tenderness: There is no abdominal tenderness. Musculoskeletal:         General: Normal range of motion. Skin:     General: Skin is warm and dry. Neurological:      Mental Status: He is alert. ASSESSMENT and PLAN  Diagnoses and all orders for this visit:    1. Hypertension, unspecified type  -     amLODIPine-Olmesartan 10-20 mg tab; Take 1 Tablet by mouth daily. 2. Enlarged prostate  -     tamsulosin (Flomax) 0.4 mg capsule; Take 1 Capsule by mouth daily.     3. Insomnia, unspecified type  -     doxylamine succinate (UNISOM) 25 mg tablet; Take 1 Tablet by mouth nightly as needed for Insomnia.     50year old male with hypertension,  and BPH,  has ran out of bp meds  We will refill medications today  Insomnia,  may takes doxylamine as needed

## 2023-05-18 RX ORDER — TAMSULOSIN HYDROCHLORIDE 0.4 MG/1
0.4 CAPSULE ORAL DAILY
COMMUNITY
Start: 2022-11-30

## 2023-05-18 RX ORDER — AMLODIPINE AND OLMESARTAN MEDOXOMIL 10; 20 MG/1; MG/1
1 TABLET ORAL DAILY
COMMUNITY
Start: 2022-11-30